# Patient Record
Sex: FEMALE | Employment: UNEMPLOYED | URBAN - METROPOLITAN AREA
[De-identification: names, ages, dates, MRNs, and addresses within clinical notes are randomized per-mention and may not be internally consistent; named-entity substitution may affect disease eponyms.]

---

## 2022-03-19 ENCOUNTER — HOSPITAL ENCOUNTER (INPATIENT)
Age: 27
LOS: 4 days | Discharge: HOME | DRG: 880 | End: 2022-03-23
Attending: EMERGENCY MEDICINE | Admitting: PSYCHIATRY & NEUROLOGY

## 2022-03-19 DIAGNOSIS — F19.10 SUBSTANCE ABUSE (HCC): ICD-10-CM

## 2022-03-19 DIAGNOSIS — F41.8 ANXIETY ASSOCIATED WITH DEPRESSION: Primary | ICD-10-CM

## 2022-03-19 PROBLEM — F43.10 PTSD (POST-TRAUMATIC STRESS DISORDER): Status: ACTIVE | Noted: 2022-03-19

## 2022-03-19 LAB
ALBUMIN SERPL-MCNC: 4.4 G/DL (ref 3.5–5)
ALBUMIN/GLOB SERPL: 1.1 {RATIO} (ref 1.1–2.2)
ALP SERPL-CCNC: 98 U/L (ref 45–117)
ALT SERPL-CCNC: 32 U/L (ref 12–78)
AMPHET UR QL SCN: NEGATIVE
ANION GAP SERPL CALC-SCNC: 4 MMOL/L (ref 5–15)
APAP SERPL-MCNC: <2 UG/ML (ref 10–30)
APPEARANCE UR: CLEAR
AST SERPL-CCNC: 16 U/L (ref 15–37)
BACTERIA URNS QL MICRO: NEGATIVE /HPF
BARBITURATES UR QL SCN: NEGATIVE
BASOPHILS # BLD: 0.1 K/UL (ref 0–0.1)
BASOPHILS NFR BLD: 0 % (ref 0–1)
BENZODIAZ UR QL: NEGATIVE
BILIRUB SERPL-MCNC: 0.2 MG/DL (ref 0.2–1)
BILIRUB UR QL: NEGATIVE
BUN SERPL-MCNC: 7 MG/DL (ref 6–20)
BUN/CREAT SERPL: 10 (ref 12–20)
CALCIUM SERPL-MCNC: 9.8 MG/DL (ref 8.5–10.1)
CANNABINOIDS UR QL SCN: POSITIVE
CHLORIDE SERPL-SCNC: 108 MMOL/L (ref 97–108)
CO2 SERPL-SCNC: 23 MMOL/L (ref 21–32)
COCAINE UR QL SCN: NEGATIVE
COLOR UR: NORMAL
COMMENT, HOLDF: NORMAL
CREAT SERPL-MCNC: 0.67 MG/DL (ref 0.55–1.02)
DIFFERENTIAL METHOD BLD: ABNORMAL
DRUG SCRN COMMENT,DRGCM: ABNORMAL
EOSINOPHIL # BLD: 0.1 K/UL (ref 0–0.4)
EOSINOPHIL NFR BLD: 0 % (ref 0–7)
EPITH CASTS URNS QL MICRO: NORMAL /LPF
ERYTHROCYTE [DISTWIDTH] IN BLOOD BY AUTOMATED COUNT: 11.9 % (ref 11.5–14.5)
ETHANOL SERPL-MCNC: <10 MG/DL
FLUAV RNA SPEC QL NAA+PROBE: NOT DETECTED
FLUAV RNA SPEC QL NAA+PROBE: NOT DETECTED
FLUBV RNA SPEC QL NAA+PROBE: NOT DETECTED
FLUBV RNA SPEC QL NAA+PROBE: NOT DETECTED
GLOBULIN SER CALC-MCNC: 4 G/DL (ref 2–4)
GLUCOSE SERPL-MCNC: 99 MG/DL (ref 65–100)
GLUCOSE UR STRIP.AUTO-MCNC: NEGATIVE MG/DL
HCG SERPL QL: NEGATIVE
HCG UR QL: NEGATIVE
HCT VFR BLD AUTO: 44.1 % (ref 35–47)
HGB BLD-MCNC: 14.7 G/DL (ref 11.5–16)
HGB UR QL STRIP: NEGATIVE
HYALINE CASTS URNS QL MICRO: NORMAL /LPF (ref 0–5)
IMM GRANULOCYTES # BLD AUTO: 0.1 K/UL (ref 0–0.04)
IMM GRANULOCYTES NFR BLD AUTO: 1 % (ref 0–0.5)
KETONES UR QL STRIP.AUTO: NEGATIVE MG/DL
LEUKOCYTE ESTERASE UR QL STRIP.AUTO: NEGATIVE
LYMPHOCYTES # BLD: 2 K/UL (ref 0.8–3.5)
LYMPHOCYTES NFR BLD: 12 % (ref 12–49)
MCH RBC QN AUTO: 29.6 PG (ref 26–34)
MCHC RBC AUTO-ENTMCNC: 33.3 G/DL (ref 30–36.5)
MCV RBC AUTO: 88.9 FL (ref 80–99)
METHADONE UR QL: NEGATIVE
MONOCYTES # BLD: 1 K/UL (ref 0–1)
MONOCYTES NFR BLD: 6 % (ref 5–13)
NEUTS SEG # BLD: 13.6 K/UL (ref 1.8–8)
NEUTS SEG NFR BLD: 81 % (ref 32–75)
NITRITE UR QL STRIP.AUTO: NEGATIVE
NRBC # BLD: 0 K/UL (ref 0–0.01)
NRBC BLD-RTO: 0 PER 100 WBC
OPIATES UR QL: NEGATIVE
PCP UR QL: NEGATIVE
PH UR STRIP: 7 [PH] (ref 5–8)
PLATELET # BLD AUTO: 300 K/UL (ref 150–400)
PMV BLD AUTO: 10.5 FL (ref 8.9–12.9)
POTASSIUM SERPL-SCNC: 3.7 MMOL/L (ref 3.5–5.1)
PROT SERPL-MCNC: 8.4 G/DL (ref 6.4–8.2)
PROT UR STRIP-MCNC: NEGATIVE MG/DL
RBC # BLD AUTO: 4.96 M/UL (ref 3.8–5.2)
RBC #/AREA URNS HPF: NORMAL /HPF (ref 0–5)
SALICYLATES SERPL-MCNC: <1.7 MG/DL (ref 2.8–20)
SAMPLES BEING HELD,HOLD: NORMAL
SARS-COV-2, COV2: NOT DETECTED
SARS-COV-2, COV2: NOT DETECTED
SODIUM SERPL-SCNC: 135 MMOL/L (ref 136–145)
SP GR UR REFRACTOMETRY: 1 (ref 1–1.03)
UR CULT HOLD, URHOLD: NORMAL
UROBILINOGEN UR QL STRIP.AUTO: 0.2 EU/DL (ref 0.2–1)
WBC # BLD AUTO: 16.8 K/UL (ref 3.6–11)
WBC URNS QL MICRO: NORMAL /HPF (ref 0–4)

## 2022-03-19 PROCEDURE — 80143 DRUG ASSAY ACETAMINOPHEN: CPT

## 2022-03-19 PROCEDURE — 65270000029 HC RM PRIVATE

## 2022-03-19 PROCEDURE — 80053 COMPREHEN METABOLIC PANEL: CPT

## 2022-03-19 PROCEDURE — 84703 CHORIONIC GONADOTROPIN ASSAY: CPT

## 2022-03-19 PROCEDURE — 36415 COLL VENOUS BLD VENIPUNCTURE: CPT

## 2022-03-19 PROCEDURE — 84443 ASSAY THYROID STIM HORMONE: CPT

## 2022-03-19 PROCEDURE — 74011250637 HC RX REV CODE- 250/637: Performed by: NURSE PRACTITIONER

## 2022-03-19 PROCEDURE — 80307 DRUG TEST PRSMV CHEM ANLYZR: CPT

## 2022-03-19 PROCEDURE — 81025 URINE PREGNANCY TEST: CPT

## 2022-03-19 PROCEDURE — 90791 PSYCH DIAGNOSTIC EVALUATION: CPT

## 2022-03-19 PROCEDURE — 87636 SARSCOV2 & INF A&B AMP PRB: CPT

## 2022-03-19 PROCEDURE — 99285 EMERGENCY DEPT VISIT HI MDM: CPT

## 2022-03-19 PROCEDURE — 82077 ASSAY SPEC XCP UR&BREATH IA: CPT

## 2022-03-19 PROCEDURE — 80179 DRUG ASSAY SALICYLATE: CPT

## 2022-03-19 PROCEDURE — 85025 COMPLETE CBC W/AUTO DIFF WBC: CPT

## 2022-03-19 PROCEDURE — 81001 URINALYSIS AUTO W/SCOPE: CPT

## 2022-03-19 RX ORDER — CLONAZEPAM 1 MG/1
0.5 TABLET ORAL
Status: COMPLETED | OUTPATIENT
Start: 2022-03-19 | End: 2022-03-19

## 2022-03-19 RX ORDER — IBUPROFEN 200 MG
1 TABLET ORAL
Status: COMPLETED | OUTPATIENT
Start: 2022-03-19 | End: 2022-03-21

## 2022-03-19 RX ADMIN — CLONAZEPAM 0.5 MG: 1 TABLET ORAL at 21:11
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
## 2022-03-19 NOTE — ED TRIAGE NOTES
Pt presents to department with a CC of SI without plan. Pt reports she has a hx of suicide attempt when she was 14 by attempting to OD on pills and drown herself in the bathtub. Pt agreeable to be admitted for behavioral health treatment.

## 2022-03-19 NOTE — ED PROVIDER NOTES
HPI   Komal Bhat is a 27 y.o. female with Hx of depression, anxiety, PTSD who presents ambulatory to [facility] ED with cc of anxiety. Patient reports that she has concerns for escalating thoughts of self-harm and lives alone. Has friends who are supportive. She does not have a plan, but is worried that she will soon have 1. She states that she lives alone and her family lives in [place] and [place]. Patient states that she has history of mental health admissions in the past, nothing recent. She states that she normally goes to [facility] for counseling, but missed her last appointment. She states she is otherwise been in her usual state of health, denies any Covid or flulike symptoms. States that she did have an upper respiratory infection last week, but symptoms have since improved. Denies F/C, N/V/D, cough, congestion, CP, SOB,urinary concerns. Reports tobacco, THC, occasional mushroom use versus abuse. Denies any alcohol or other substance abuse. Denies chance of pregnancy. PCP: No primary care provider on file. There are no other complaints, changes or physical findings at this time. Past Medical History:   Diagnosis Date    Anxiety     Depression     PTSD (post-traumatic stress disorder)        History reviewed. No pertinent surgical history. History reviewed. No pertinent family history. Social History     Socioeconomic History    Marital status: SINGLE     Spouse name: Not on file    Number of children: Not on file    Years of education: Not on file    Highest education level: Not on file   Occupational History    Not on file   Tobacco Use    Smoking status: Current Every Day Smoker     Packs/day: 0.25    Smokeless tobacco: Never Used   Substance and Sexual Activity    Alcohol use:  Yes     Alcohol/week: 3.0 standard drinks     Types: 3 Shots of liquor per week    Drug use: Yes     Frequency: 7.0 times per week     Types: Marijuana    Sexual activity: Not on file   Other Topics Concern    Not on file   Social History Narrative    Not on file     Social Determinants of Health     Financial Resource Strain:     Difficulty of Paying Living Expenses: Not on file   Food Insecurity:     Worried About [address] in the Last Year: Not on file    Ramesh of Food in the Last Year: Not on file   Transportation Needs:     Lack of Transportation (Medical): Not on file    Lack of Transportation (Non-Medical): Not on file   Physical Activity:     Days of Exercise per Week: Not on file    Minutes of Exercise per Session: Not on file   Stress:     Feeling of Stress : Not on file   Social Connections:     Frequency of Communication with Friends and Family: Not on file    Frequency of Social Gatherings with Friends and Family: Not on file    Attends [employer] Services: Not on file    Active Member of [address] [place] [address] or Organizations: Not on file    Attends Club or Organization Meetings: Not on file    Marital Status: Not on file   Intimate Partner Violence:     Fear of Current or Ex-Partner: Not on file    Emotionally Abused: Not on file    Physically Abused: Not on file    Sexually Abused: Not on file   Housing Stability:     Unable to Pay for Housing in the Last Year: Not on file    Number of [place] in the Last Year: Not on file    Unstable Housing in the Last Year: Not on file         ALLERGIES: Penicillins and Sulfa (sulfonamide antibiotics)    Review of Systems   Constitutional: Negative for activity change, appetite change, chills and fever. HENT: Negative for congestion, rhinorrhea and sore throat. Eyes: Negative for visual disturbance. Respiratory: Negative for cough and shortness of breath. Cardiovascular: Negative for chest pain. Gastrointestinal: Negative for abdominal pain, diarrhea, nausea and vomiting. Genitourinary: Negative for dysuria, flank pain, frequency and urgency.    Musculoskeletal: Negative for arthralgias, back pain, gait problem and neck pain. Skin: Negative for color change and rash. Neurological: Negative for dizziness, speech difficulty, weakness, light-headedness, numbness and headaches. Psychiatric/Behavioral: Positive for dysphoric mood and suicidal ideas. Negative for agitation, behavioral problems, confusion and self-injury. The patient is nervous/anxious. All other systems reviewed and are negative. Vitals:    03/19/22 1828   BP: (!) 160/100   Pulse: 97   Resp: 16   Temp: 98 °F (36.7 °C)   SpO2: 98%            Physical Exam  Vitals and nursing note reviewed. Constitutional:       General: She is not in acute distress. Appearance: She is well-developed. HENT:      Head: Normocephalic and atraumatic. Right Ear: External ear normal.      Left Ear: External ear normal.   Eyes:      Conjunctiva/sclera: Conjunctivae normal.      Pupils: Pupils are equal, round, and reactive to light. Cardiovascular:      Rate and Rhythm: Normal rate and regular rhythm. Heart sounds: Normal heart sounds. Pulmonary:      Effort: Pulmonary effort is normal.      Breath sounds: Normal breath sounds. Musculoskeletal:         General: Normal range of motion. Cervical back: Normal range of motion and neck supple. Skin:     General: Skin is warm and dry. Neurological:      Mental Status: She is alert and oriented to person, place, and time. Psychiatric:         Attention and Perception: She is inattentive. Mood and Affect: Mood is anxious. Affect is labile and tearful. Speech: Speech is rapid and pressured. Behavior: Behavior is hyperactive. Thought Content: Thought content includes suicidal ideation.          Cognition and Memory: Cognition and memory normal.         Judgment: Judgment normal.          MDM  Number of Diagnoses or Management Options  Anxiety associated with depression  Substance abuse ([facility] [address]  Diagnosis management comments: Ddx: depression, anxiety, mood d/o Patient presents to the emergency department with concern for anxiety, with depression, mood related concerns. Patient was provided with anxiolytic as she was picking at her skin becoming more agitated while in the emergency department. She is medically clear for admission to inpatient psychiatry. Did have an elevated white blood cell count, but did not relate any current infectious symptoms. She did state that she had a mild respiratory illness last week and this could have been contributory. Amount and/or Complexity of Data Reviewed  Clinical lab tests: reviewed and ordered  Review and summarize past medical records: yes           Procedures    LABORATORY TESTS:  Recent Results (from the past 12 hour(s))   CBC WITH AUTOMATED DIFF    Collection Time: 03/19/22  7:28 PM   Result Value Ref Range    WBC 16.8 (H) 3.6 - 11.0 K/uL    RBC 4.96 3.80 - 5.20 M/uL    HGB 14.7 11.5 - 16.0 g/dL    HCT 44.1 35.0 - 47.0 %    MCV 88.9 80.0 - 99.0 FL    MCH 29.6 26.0 - 34.0 PG    MCHC 33.3 30.0 - 36.5 g/dL    RDW 11.9 11.5 - 14.5 %    PLATELET 595 695 - 950 K/uL    MPV 10.5 8.9 - 12.9 FL    NRBC 0.0 0  WBC    ABSOLUTE NRBC 0.00 0.00 - 0.01 K/uL    NEUTROPHILS 81 (H) 32 - 75 %    LYMPHOCYTES 12 12 - 49 %    MONOCYTES 6 5 - 13 %    EOSINOPHILS 0 0 - 7 %    BASOPHILS 0 0 - 1 %    IMMATURE GRANULOCYTES 1 (H) 0.0 - 0.5 %    ABS. NEUTROPHILS 13.6 (H) 1.8 - 8.0 K/UL    ABS. LYMPHOCYTES 2.0 0.8 - 3.5 K/UL    ABS. MONOCYTES 1.0 0.0 - 1.0 K/UL    ABS. EOSINOPHILS 0.1 0.0 - 0.4 K/UL    ABS. BASOPHILS 0.1 0.0 - 0.1 K/UL    ABS. IMM.  GRANS. 0.1 (H) 0.00 - 0.04 K/UL    DF AUTOMATED     METABOLIC PANEL, COMPREHENSIVE    Collection Time: 03/19/22  7:28 PM   Result Value Ref Range    Sodium 135 (L) 136 - 145 mmol/L    Potassium 3.7 3.5 - 5.1 mmol/L    Chloride 108 97 - 108 mmol/L    CO2 23 21 - 32 mmol/L    Anion gap 4 (L) 5 - 15 mmol/L    Glucose 99 65 - 100 mg/dL    BUN 7 6 - 20 MG/DL    Creatinine 0.67 0.55 - 1.02 MG/DL BUN/Creatinine ratio 10 (L) 12 - 20      GFR est AA >60 >60 ml/min/1.73m2    GFR est non-AA >60 >60 ml/min/1.73m2    Calcium 9.8 8.5 - 10.1 MG/DL    Bilirubin, total 0.2 0.2 - 1.0 MG/DL    ALT (SGPT) 32 12 - 78 U/L    AST (SGOT) 16 15 - 37 U/L    Alk. phosphatase 98 45 - 117 U/L    Protein, total 8.4 (H) 6.4 - 8.2 g/dL    Albumin 4.4 3.5 - 5.0 g/dL    Globulin 4.0 2.0 - 4.0 g/dL    A-G Ratio 1.1 1.1 - 2.2     ETHYL ALCOHOL    Collection Time: 03/19/22  7:28 PM   Result Value Ref Range    ALCOHOL(ETHYL),SERUM <10 <10 MG/DL   ACETAMINOPHEN    Collection Time: 03/19/22  7:28 PM   Result Value Ref Range    Acetaminophen level <2 (L) 10 - 30 ug/mL   SALICYLATE    Collection Time: 03/19/22  7:28 PM   Result Value Ref Range    Salicylate level <8.9 (L) 2.8 - 20.0 MG/DL   COVID-19 WITH INFLUENZA A/B    Collection Time: 03/19/22  7:28 PM   Result Value Ref Range    SARS-CoV-2 by PCR Not detected NOTD      Influenza A by PCR Not detected NOTD      Influenza B by PCR Not detected NOTD     SAMPLES BEING HELD    Collection Time: 03/19/22  7:28 PM   Result Value Ref Range    SAMPLES BEING HELD 1red     COMMENT        Add-on orders for these samples will be processed based on acceptable specimen integrity and analyte stability, which may vary by analyte.    HCG QL SERUM    Collection Time: 03/19/22  7:28 PM   Result Value Ref Range    HCG, Ql. Negative NEG     DRUG SCREEN, URINE    Collection Time: 03/19/22  7:38 PM   Result Value Ref Range    AMPHETAMINES Negative NEG      BARBITURATES Negative NEG      BENZODIAZEPINES Negative NEG      COCAINE Negative NEG      METHADONE Negative NEG      OPIATES Negative NEG      PCP(PHENCYCLIDINE) Negative NEG      THC (TH-CANNABINOL) Positive (A) NEG      Drug screen comment (NOTE)    URINALYSIS W/MICROSCOPIC    Collection Time: 03/19/22  7:38 PM   Result Value Ref Range    Color YELLOW/STRAW      Appearance CLEAR CLEAR      Specific gravity 1.005 1.003 - 1.030      pH (UA) 7.0 5.0 - 8.0 Protein Negative NEG mg/dL    Glucose Negative NEG mg/dL    Ketone Negative NEG mg/dL    Bilirubin Negative NEG      Blood Negative NEG      Urobilinogen 0.2 0.2 - 1.0 EU/dL    Nitrites Negative NEG      Leukocyte Esterase Negative NEG      WBC 0-4 0 - 4 /hpf    RBC 0-5 0 - 5 /hpf    Epithelial cells FEW FEW /lpf    Bacteria Negative NEG /hpf    Hyaline cast 0-2 0 - 5 /lpf   URINE CULTURE HOLD SAMPLE    Collection Time: 03/19/22  7:38 PM    Specimen: Serum; Urine   Result Value Ref Range    Urine culture hold        Urine on hold in Microbiology dept for 2 days. If unpreserved urine is submitted, it cannot be used for addtional testing after 24 hours, recollection will be required.    COVID-19 WITH INFLUENZA A/B    Collection Time: 03/19/22  8:51 PM   Result Value Ref Range    SARS-CoV-2 by PCR Not detected NOTD      Influenza A by PCR Not detected NOTD      Influenza B by PCR Not detected NOTD     HCG URINE, QL. - POC    Collection Time: 03/19/22  9:33 PM   Result Value Ref Range    Pregnancy test,urine (POC) Negative NEG         IMAGING RESULTS:  No orders to display       MEDICATIONS GIVEN:  Medications   nicotine (NICODERM CQ) 14 mg/24 hr patch 1 Patch (1 Patch TransDERmal Apply Patch 3/19/22 2117)   OLANZapine (ZyPREXA) tablet 5 mg (has no administration in time range)   haloperidol lactate (HALDOL) injection 5 mg (has no administration in time range)   benztropine (COGENTIN) tablet 1 mg (has no administration in time range)   diphenhydrAMINE (BENADRYL) injection 50 mg (has no administration in time range)   hydrOXYzine HCL (ATARAX) tablet 50 mg (50 mg Oral Given 3/20/22 0111)   LORazepam (ATIVAN) injection 1 mg (has no administration in time range)   traZODone (DESYREL) tablet 50 mg (50 mg Oral Given 3/20/22 0111)   acetaminophen (TYLENOL) tablet 650 mg (has no administration in time range)   magnesium hydroxide (MILK OF MAGNESIA) 400 mg/5 mL oral suspension 30 mL (has no administration in time range) clonazePAM (KlonoPIN) tablet 0.5 mg (0.5 mg Oral Given 3/19/22 2111)       IMPRESSION:  1. Anxiety associated with depression    2. Substance abuse ([facility] [address]        PLAN:  Admit to inpatient mental health.      Nitza Campbell NP

## 2022-03-20 LAB — TSH SERPL DL<=0.05 MIU/L-ACNC: 0.92 UIU/ML (ref 0.36–3.74)

## 2022-03-20 PROCEDURE — 74011250637 HC RX REV CODE- 250/637: Performed by: NURSE PRACTITIONER

## 2022-03-20 PROCEDURE — 65220000003 HC RM SEMIPRIVATE PSYCH

## 2022-03-20 PROCEDURE — 74011250637 HC RX REV CODE- 250/637

## 2022-03-20 RX ORDER — LORAZEPAM 2 MG/ML
1 INJECTION INTRAMUSCULAR
Status: DISCONTINUED | OUTPATIENT
Start: 2022-03-20 | End: 2022-03-23 | Stop reason: HOSPADM

## 2022-03-20 RX ORDER — OLANZAPINE 5 MG/1
5 TABLET ORAL
Status: DISCONTINUED | OUTPATIENT
Start: 2022-03-20 | End: 2022-03-21

## 2022-03-20 RX ORDER — IBUPROFEN 200 MG
1 TABLET ORAL
Status: DISCONTINUED | OUTPATIENT
Start: 2022-03-21 | End: 2022-03-20

## 2022-03-20 RX ORDER — HALOPERIDOL 5 MG/ML
5 INJECTION INTRAMUSCULAR
Status: DISCONTINUED | OUTPATIENT
Start: 2022-03-20 | End: 2022-03-23 | Stop reason: HOSPADM

## 2022-03-20 RX ORDER — ACETAMINOPHEN 325 MG/1
650 TABLET ORAL
Status: DISCONTINUED | OUTPATIENT
Start: 2022-03-20 | End: 2022-03-23 | Stop reason: HOSPADM

## 2022-03-20 RX ORDER — TRAZODONE HYDROCHLORIDE 50 MG/1
50 TABLET ORAL
Status: DISCONTINUED | OUTPATIENT
Start: 2022-03-20 | End: 2022-03-23 | Stop reason: HOSPADM

## 2022-03-20 RX ORDER — IBUPROFEN 200 MG
1 TABLET ORAL DAILY
Status: DISCONTINUED | OUTPATIENT
Start: 2022-03-21 | End: 2022-03-20

## 2022-03-20 RX ORDER — DM/P-EPHED/ACETAMINOPH/DOXYLAM 30-7.5/3
2 LIQUID (ML) ORAL
Status: DISCONTINUED | OUTPATIENT
Start: 2022-03-20 | End: 2022-03-23 | Stop reason: HOSPADM

## 2022-03-20 RX ORDER — ADHESIVE BANDAGE
30 BANDAGE TOPICAL DAILY PRN
Status: DISCONTINUED | OUTPATIENT
Start: 2022-03-20 | End: 2022-03-23 | Stop reason: HOSPADM

## 2022-03-20 RX ORDER — DIPHENHYDRAMINE HYDROCHLORIDE 50 MG/ML
50 INJECTION, SOLUTION INTRAMUSCULAR; INTRAVENOUS
Status: DISCONTINUED | OUTPATIENT
Start: 2022-03-20 | End: 2022-03-23 | Stop reason: HOSPADM

## 2022-03-20 RX ORDER — BENZTROPINE MESYLATE 1 MG/1
1 TABLET ORAL
Status: DISCONTINUED | OUTPATIENT
Start: 2022-03-20 | End: 2022-03-23 | Stop reason: HOSPADM

## 2022-03-20 RX ORDER — HYDROXYZINE 50 MG/1
50 TABLET, FILM COATED ORAL
Status: DISCONTINUED | OUTPATIENT
Start: 2022-03-20 | End: 2022-03-21

## 2022-03-20 RX ORDER — ARIPIPRAZOLE 5 MG/1
5 TABLET ORAL DAILY
Status: DISCONTINUED | OUTPATIENT
Start: 2022-03-20 | End: 2022-03-21

## 2022-03-20 RX ADMIN — HYDROXYZINE HYDROCHLORIDE 50 MG: 50 TABLET, FILM COATED ORAL at 17:33

## 2022-03-20 RX ADMIN — TRAZODONE HYDROCHLORIDE 50 MG: 50 TABLET ORAL at 22:21

## 2022-03-20 RX ADMIN — TRAZODONE HYDROCHLORIDE 50 MG: 50 TABLET ORAL at 01:11

## 2022-03-20 RX ADMIN — ACETAMINOPHEN 650 MG: 325 TABLET ORAL at 09:25

## 2022-03-20 RX ADMIN — ARIPIPRAZOLE 5 MG: 5 TABLET ORAL at 12:28

## 2022-03-20 RX ADMIN — OLANZAPINE 5 MG: 5 TABLET, FILM COATED ORAL at 18:13

## 2022-03-20 RX ADMIN — HYDROXYZINE HYDROCHLORIDE 50 MG: 50 TABLET, FILM COATED ORAL at 01:11

## 2022-03-20 RX ADMIN — HYDROXYZINE HYDROCHLORIDE 50 MG: 50 TABLET, FILM COATED ORAL at 09:25

## 2022-03-20 NOTE — ROUTINE PROCESS
TRANSFER - OUT REPORT:    Verbal report given to Louie House RN(name) on Elizabeth Keith  being transferred to 6/(unit) for routine progression of care       Report consisted of patients Situation, Background, Assessment and   Recommendations(SBAR). Information from the following report(s) SBAR was reviewed with the receiving nurse. Lines:       Opportunity for questions and clarification was provided.       Patient transported with:   Tech, and secutrity
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
## 2022-03-20 NOTE — ED NOTES
Patient asked if I could please remove or hide the cord to the thermometer, stating \"my brain keeps making me think I should try to kill myself with it. \"  Thermometer is mounted on wall. Cord is now hidden.
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
## 2022-03-20 NOTE — PROGRESS NOTES
PRN Medication Documentation    Specific patient behavior that led to need for PRN medication: c/o anxiety  Staff interventions attempted prior to PRN being given: therapeutic communication  PRN medication given: Atarax @ 1260  Patient response/effectiveness of PRN medication: Will continue to monitor        Problem: Falls - Risk of  Goal: *Absence of Falls  Description: Document Shila Bourne Fall Risk and appropriate interventions in the flowsheet.   Outcome: Progressing Towards Goal  Note: Fall Risk Interventions:  Problem: Patient Education: Go to Patient Education Activity  Goal: Patient/Family Education  Outcome: Progressing Towards Goal  Problem: Depressed Mood (Adult/Pediatric)  Goal: *STG: Attends activities and groups  Outcome: Progressing Towards Goal  Goal: *STG: Remains safe in hospital  Outcome: Progressing Towards Goal  Goal: *LTG: Understands illness and can identify signs of relapse  Outcome: Progressing Towards Goal

## 2022-03-20 NOTE — PROGRESS NOTES
[facility]  Master Treatment Plan for Belia Amador    Date Treatment Plan Initiated: 3/20/22    Treatment Plan Modalities:  Type of Modality Amount  (x minutes) Frequency (x/week) Duration (x days) Name of Responsible Staff   Community & wrap-up meetings to encourage peer interactions 15 7 1 MAU Martell     Group psychotherapy to assist in building coping skills and internal controls 60 7 1 Amanda Monsivais   Therapeutic activity groups to build coping skills 60 7 1 Amanda Monsivais   Psychoeducation in group setting to address:   Medication education   15 [address] PharmD   Coping skills   30 3 1 Amanda Monsivais   Relaxation techniques         Symptom management         Discharge planning   60 2 1 Amanda Monsivais   Spirituality    60 2 1 Chaplain GLYNN   61 1 1 [facility]/AA/NA         Physician medication management   15 7 1 Dr. Melchor Zamora meeting/discharge planning   15 2 1 Norma Weller and Amanda Monsivais                                   Problem: Falls - Risk of  Goal: *Absence of Falls  Description: Document Salomon Fall Risk and appropriate interventions in the flowsheet. Outcome: Progressing Towards Goal  Note: Fall Risk Interventions:     Problem: Patient Education: Go to Patient Education Activity  Goal: Patient/Family Education  Outcome: Progressing Towards Goal     Problem: Depressed Mood (Adult/Pediatric)  Goal: *STG: Participates in treatment plan  Outcome: Progressing Towards Goal  Note: Pt participated in treatment team. Isolating in her room. Ate breakfast during the shift. Goal: *STG: Verbalizes anger, guilt, and other feelings in a constructive manor  Outcome: Progressing Towards Goal  Note: Able to verbalize feelings in a constructive manor. Stated she is feeling depressed for several months. Complaining of poor sleep. Goal: *STG: Attends activities and groups  Outcome: Progressing Towards Goal  Note: Encouraged pt to attend groups and and participate. Goal: *STG: Remains safe in hospital  Outcome: Progressing Towards Goal  Note: Remains safe on the unit and continued on Q 15 minute safety checks. Goal: *STG: Complies with medication therapy  Outcome: Progressing Towards Goal  Note: No scheduled medications at this time.   Goal: Interventions  Outcome: Progressing Towards Goal     Problem: Patient Education: Go to Patient Education Activity  Goal: Patient/Family Education  Outcome: Progressing Towards Goal
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
## 2022-03-20 NOTE — BH NOTES
PRN Medication Documentation    Specific patient behavior that led to need for PRN medication: pt c/o anxiety and requesting medication to promote rest  Staff interventions attempted prior to PRN being given: med education and therapeutic communication   PRN medication given: atarax 50 mg po and trazodone 50 mg po  Patient response/effectiveness of PRN medication: staff will continue to monitor and provide support.

## 2022-03-20 NOTE — BH NOTES
GROUP THERAPY PROGRESS NOTE    Patient did not participate in Self-Care group.     Jolynn Crawford, Social Work Case Management

## 2022-03-20 NOTE — BH NOTES
TRANSFER - IN REPORT:    Verbal report received from [address] [place] [address], [address] RN(name) on Jesus Alberto Montes  being received from [facility] [place] ED(unit) for routine progression of care      Report consisted of patients Situation, Background, Assessment and   Recommendations(SBAR). Information from the following report(s) SBAR, MAR and Recent Results was reviewed with the receiving nurse. Opportunity for questions and clarification was provided. Assessment completed upon patients arrival to unit and care assumed.

## 2022-03-21 PROCEDURE — 74011250637 HC RX REV CODE- 250/637

## 2022-03-21 PROCEDURE — 74011250637 HC RX REV CODE- 250/637: Performed by: PSYCHIATRY & NEUROLOGY

## 2022-03-21 PROCEDURE — 74011250637 HC RX REV CODE- 250/637: Performed by: NURSE PRACTITIONER

## 2022-03-21 PROCEDURE — 65220000003 HC RM SEMIPRIVATE PSYCH

## 2022-03-21 RX ORDER — ARIPIPRAZOLE 2 MG/1
2 TABLET ORAL DAILY
Status: DISCONTINUED | OUTPATIENT
Start: 2022-03-22 | End: 2022-03-23 | Stop reason: HOSPADM

## 2022-03-21 RX ORDER — HYDROXYZINE 50 MG/1
50 TABLET, FILM COATED ORAL
Status: DISCONTINUED | OUTPATIENT
Start: 2022-03-21 | End: 2022-03-23 | Stop reason: HOSPADM

## 2022-03-21 RX ORDER — LAMOTRIGINE 25 MG/1
25 TABLET ORAL DAILY
Status: DISCONTINUED | OUTPATIENT
Start: 2022-03-21 | End: 2022-03-23 | Stop reason: HOSPADM

## 2022-03-21 RX ADMIN — NICOTINE POLACRILEX 2 MG: 2 LOZENGE ORAL at 14:38

## 2022-03-21 RX ADMIN — TRAZODONE HYDROCHLORIDE 50 MG: 50 TABLET ORAL at 20:56

## 2022-03-21 RX ADMIN — Medication 1 LOZENGE: at 20:56

## 2022-03-21 RX ADMIN — Medication 1 LOZENGE: at 11:05

## 2022-03-21 RX ADMIN — HYDROXYZINE HYDROCHLORIDE 50 MG: 50 TABLET, FILM COATED ORAL at 23:45

## 2022-03-21 RX ADMIN — ARIPIPRAZOLE 5 MG: 5 TABLET ORAL at 08:43

## 2022-03-21 RX ADMIN — LAMOTRIGINE 25 MG: 25 TABLET ORAL at 11:05

## 2022-03-21 RX ADMIN — Medication 1 LOZENGE: at 17:23

## 2022-03-21 NOTE — PROGRESS NOTES
Problem: Depressed Mood (Adult/Pediatric)  Goal: *STG: Participates in treatment plan  Outcome: Progressing Towards Goal  Note: Out on unit engaged, social w a brighter affect, mood stable and reports hopeful. Actively participating in groups and activities. Future focused, denies SI, no self harming behaviors. States intrusive thoughts are gone and reports feeling safe. Daily goal is to discuss d/c home. Staff focus is on offering support and reassurance.    Goal: *STG: Verbalizes anger, guilt, and other feelings in a constructive manor  Outcome: Progressing Towards Goal  Goal: *STG: Attends activities and groups  Outcome: Progressing Towards Goal  Goal: Interventions  Outcome: Progressing Towards Goal

## 2022-03-21 NOTE — BH NOTES
PRN Medication Documentation    Specific patient behavior that led to need for PRN medication: increased anxiety  Staff interventions attempted prior to PRN being given: decrease stimuli, dimming lights, etc  PRN medication given: PO atarax  Patient response/effectiveness of PRN medication: will continue to monitor

## 2022-03-21 NOTE — BH NOTES
GROUP THERAPY PROGRESS NOTE    Patient did not participate in Self-care group.     Rhys Cannon, Social Work Case Management

## 2022-03-21 NOTE — BH NOTES
GROUP THERAPY PROGRESS NOTE    Patient is participating in psychotherapy group. Group time: 60 minutes    Personal goal for participation: to develop an understanding of cognitive distortions and how to alter our thinking. Goal orientation: Personal    Group therapy participation:  Active     Therapeutic interventions reviewed and discussed:  Group members were guided through learning about cognitive distortions. Members gained an understanding of how these types of distortions effect perception, relationships, and overall mental health. Members were guided through learning about methods that can be used for changing negative thought patterns. Members were able to identify distortions and engage in discussion about past experiences. Handout provided. Impression of participation:  Pt was present and engaged in group discussion. Pt added insight to group topic. Pt interacted with peers and sw. Pt was calm, cooperative.        ADELAIDA Mcdermott, QMHP-A

## 2022-03-21 NOTE — BH NOTES
Behavioral Health Interdisciplinary Note     Patient goal(s) for today: Participate in groups and voice concerns to staff. Treatment team focus/goals: Treatment and discharge planning. Progress note: Pt was AOx4. Pt was pleasant and cooperative. Pt has good insight and reports coming to the realization they are not deserving of self-harm feelings. Pt reports less intrusive thoughts of self-harm. Pt reported Hx of self-harm 10 years ago. Pt reports feeling foggy. Pt signed YONY for father, Octavio Cartagena. MSW intern spoke with pts father about discharge plans. Pts father is supportive of pt. LOS:  3                       Expected LOS:      Financial concerns/prescription coverage: [employer] cross blue shield. Family contact: Octavio Cartagena, father [phone] (SIGNED YONY)                   Family requesting physician contact today:  none  Discharge plan: Pt reports living alone. Access to weapons : none reported. Outpatient provider(s):  - [facility], medication management intake with Omar Price, March 31st @10AM, virtual.   - Jeni Ellsworth, [email or web address], counselor ([phone]) Pt will call to schedule appointment. Patient's preferred phone number for follow up call : [phone]  Patient's preferred e-mail address : [email or web address]  Participating treatment team members: Keron Peng;  [address], MSW Intern; ANAND Jara; Albin Moore NP; Jacqueline CUEVAS; Nilay Moore, NP.

## 2022-03-21 NOTE — BH NOTES
PSYCHIATRIC PROGRESS NOTE       Patient: Radha Burnett MRN: [id number]  SSN: [id number]    YOB: 1995  Age: 27 y.o. Sex: adult      Admit Date: 3/19/2022    LOS: 2 days       Chief Complaint:  Better than yesterday. Interval History:  Radha Burnett \"Bee\" says they are feeling better. Says suicidal thoughts are decreasing in frequency. Denies any intent or plan. Denies hi or hallucinations. Says sleep varies, wakes up in cold sweats. Says they did well on wellbutrin but had \"shakes\" from it. 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 Educated them that abilify has more chances of tremors than antidepressants. Not comfortable starting ssri. They are willing to start lamictal. The adverse events of this medication were discussed with the patient Radha Burnett in detail including SJS. Radha Burnett understands the benefits as well as the risks involved in taking it and gave verbal informed consent for the same. Will decrease abilify to 2 mg. Calm and pleasant. She received quite a few prns last night, feeling foggy this morning.        Past Medical History:  Past Medical History:   Diagnosis Date    Anxiety     Depression     PTSD (post-traumatic stress disorder)          ALLERGIES:(reviewed/updated 3/21/2022)  Allergies   Allergen Reactions    Penicillins Other (comments)    Sulfa (Sulfonamide Antibiotics) Unknown (comments)       Laboratory report:  Lab Results   Component Value Date/Time    WBC 16.8 (H) 03/19/2022 07:28 PM    HGB 14.7 03/19/2022 07:28 PM    HCT 44.1 03/19/2022 07:28 PM    PLATELET 612 44/62/1447 07:28 PM    MCV 88.9 03/19/2022 07:28 PM      Lab Results   Component Value Date/Time    Sodium 135 (L) 03/19/2022 07:28 PM    Potassium 3.7 03/19/2022 07:28 PM    Chloride 108 03/19/2022 07:28 PM    CO2 23 03/19/2022 07:28 PM    Anion gap 4 (L) 03/19/2022 07:28 PM    Glucose 99 03/19/2022 07:28 PM    BUN 7 03/19/2022 07:28 PM    Creatinine 0.67 03/19/2022 07:28 PM BUN/Creatinine ratio 10 (L) 03/19/2022 07:28 PM    GFR est AA >60 03/19/2022 07:28 PM    GFR est non-AA >60 03/19/2022 07:28 PM    Calcium 9.8 03/19/2022 07:28 PM    Bilirubin, total 0.2 03/19/2022 07:28 PM    Alk. phosphatase 98 03/19/2022 07:28 PM    Protein, total 8.4 (H) 03/19/2022 07:28 PM    Albumin 4.4 03/19/2022 07:28 PM    Globulin 4.0 03/19/2022 07:28 PM    A-G Ratio 1.1 03/19/2022 07:28 PM    ALT (SGPT) 32 03/19/2022 07:28 PM      Vitals:    03/20/22 1324 03/20/22 1639 03/20/22 1956 03/21/22 0816   BP: 131/77 110/73 104/72 121/78   Pulse: (!) 58 81 96 76   Resp: 16 16 16 16   Temp: 97.6 °F (36.4 °C) 98.6 °F (37 °C) 98.7 °F (37.1 °C) 98.5 °F (36.9 °C)   SpO2: 98% 98%  99%   Weight:       Height:           No results found for: VALF2, VALAC, VALP, VALPR, DS6, CRBAM, CRBAMP, CARB2, XCRBAM  No results found for: LITHM    Vital Signs  Patient Vitals for the past 24 hrs:   Temp Pulse Resp BP SpO2   03/21/22 0816 98.5 °F (36.9 °C) 76 16 121/78 99 %   03/20/22 1956 98.7 °F (37.1 °C) 96 16 104/72    03/20/22 1639 98.6 °F (37 °C) 81 16 110/73 98 %   03/20/22 1324 97.6 °F (36.4 °C) (!) 58 16 131/77 98 %     Wt Readings from Last 3 Encounters:   03/20/22 64 kg (141 lb 1.6 oz)     Temp Readings from Last 3 Encounters:   03/21/22 98.5 °F (36.9 °C)     BP Readings from Last 3 Encounters:   03/21/22 121/78     Pulse Readings from Last 3 Encounters:   03/21/22 76       Radiology (reviewed/updated 3/21/2022)  No results found.     Current Facility-Administered Medications   Medication Dose Route Frequency Provider Last Rate Last Admin    OLANZapine (ZyPREXA) tablet 5 mg  5 mg Oral Q6H PRN Nghia Kittredge, NP   5 mg at 03/20/22 1813    haloperidol lactate (HALDOL) injection 5 mg  5 mg IntraMUSCular Q6H PRN Nghia Kittredge, NP        benztropine (COGENTIN) tablet 1 mg  1 mg Oral BID PRN Nghia Kittredge, NP        diphenhydrAMINE (BENADRYL) injection 50 mg  50 mg IntraMUSCular BID PRN Nghia Kittredge, NP        hydrOXYzine HCL (ATARAX) tablet 50 mg  50 mg Oral TID PRN Skylar Rear, NP   50 mg at 03/20/22 1733    LORazepam (ATIVAN) injection 1 mg  1 mg IntraMUSCular Q4H PRN Skylar Rear, NP        traZODone (DESYREL) tablet 50 mg  50 mg Oral QHS PRN Skylar Rear, NP   50 mg at 03/20/22 2221    acetaminophen (TYLENOL) tablet 650 mg  650 mg Oral Q4H PRN Skylar Rear, NP   650 mg at 03/20/22 1691    magnesium hydroxide (MILK OF MAGNESIA) 400 mg/5 mL oral suspension 30 mL  30 mL Oral DAILY PRN Skylar Rear, NP        ARIPiprazole (ABILIFY) tablet 5 mg  5 mg Oral DAILY Januaryard Damieny, NP   5 mg at 03/21/22 0631    nicotine buccal (POLACRILEX) lozenge 2 mg  2 mg Oral Q2H PRN Estuardo Cardoza MD           Side Effects: (reviewed/updated 3/21/2022)  None reported or admitted to. Review of Systems: (reviewed/updated 3/21/2022)  Appetite: good  Sleep: good   All other Review of Systems: negative    Mental Status Exam:  Eye contact: Good eye contact  Psychomotor activity: Relaxed  Speech is spontaneous  Thought process: Logical and goal directed   Mood is \"ok\"  Affect: Blunted  Perception: No avh  Suicidal ideation: passive si-decreasing. Homicidal ideation: No hi  Insight/judgment: Partial   Cognition is grossly intact      Physical Exam:  Musculoskeletal system: steady gait  Tremor not present  Cog wheeling not present      Assessment and Plan:  Cristela Ho meets criteria for a diagnosis of MDD, CIARA, PTSD, rule out mood disorder    Decrease abilify to 2 mg. Start lamictal 25 mg daily. Dc zyprexa prn. Continue rest of medications as prescribed. We will closely monitor for safety. We will encourage reality orientation. Disposition planning to continue.        I certify that this patients inpatient psychiatric hospital services furnished since the previous certification were, and continue to be, required for treatment that could reasonably be expected to improve the patient's condition, or for diagnostic study, and that the patient continues to need, on a daily basis, active treatment furnished directly by or requiring the supervision of inpatient psychiatric facility personnel. In addition, the hospital records show that services furnished were intensive treatment services, admission or related services, or equivalent services.       Signed:  Brian Ma NP  3/21/2022

## 2022-03-21 NOTE — BH NOTES
GROUP THERAPY PROGRESS NOTE    Patient did not participate in psychotherapy group.     Cathy Danielle MSW, [facility]

## 2022-03-21 NOTE — INTERDISCIPLINARY ROUNDS
Behavioral Health Interdisciplinary Rounds     Patient Name: Santino Mario  Age: 27 y.o. Room/Bed:  746/  Primary Diagnosis: <principal problem not specified>   Admission Status: Voluntary     Readmission within 30 days: no  Power of  in place: no  Patient requires a blocked bed: no          Reason for blocked bed:     VTE Prophylaxis: Not indicated    Mobility needs/Fall risk: no  Flu Vaccine :   Nutritional Plan:   Consults:        Labs/Testing due today?: no    Sleep hours: 7+        Participation in Care/Groups:  yes  Medication Compliant?: Yes  PRNS (last 24 hours): Antipsychotic (PO), Antianxiety and Sleep Aid    Restraints (last 24 hours):  no     CIWA (range last 24 hours):     COWS (range last 24 hours):      Alcohol screening (AUDIT) completed -         If applicable, date SBIRT discussed in treatment team AND documented:   AUDIT Screen Score:        Document Brief Intervention (corresponds directly with the 5 A's, Ask, Advise, Assess, Assist, and Arrange): At- Risk Patients (Score 7-15 for women; 8-15 for men)  Discuss concern patient is drinking at unhealthy levels known to increase risk of alcohol-related health problems. Is Patient ready to commit to change? If No:   Encourage reflection   Discuss short term and long term health risks of consuming alcohol   Barriers to change   Reaffirm willingness to help / Educational materials provided  If Yes:   Set goal  [employer] provided    Harmful use or Dependence (Score 16 or greater)   Discuss short term and long term health risks of consuming alcohol   Recommendations   Negotiate drinking goal   Recommend addiction specialist/center   Arrange follow-up appointments.     Tobacco - patient is a smoker: Have You Used Tobacco in the Past 30 Days: Yes  Illegal Drugs use: Have You Used Any Illegal Substances Over the Past 12 Months: Yes    24 hour chart check complete: yes ____________________________________________________________________________________________________________    Patient goal(s) for today:   Treatment team focus/goals:   Progress note     LOS:  2  Expected LOS:     Financial concerns/prescription coverage:    Family contact:     Family requesting physician contact today:    Discharge plan:   Access to weapons :         Outpatient provider(s):   Patient's preferred phone number for follow up call :   Patient's preferred e-mail address :  Participating treatment team members: Bronwyn Connell (assigned SW),

## 2022-03-21 NOTE — PROGRESS NOTES
Laboratory Monitoring for Antipsychotics: This patient is currently prescribed the following medication(s):   Current Facility-Administered Medications   Medication Dose Route Frequency    [START ON 3/22/2022] ARIPiprazole (ABILIFY) tablet 2 mg  2 mg Oral DAILY    lamoTRIgine (LaMICtal) tablet 25 mg  25 mg Oral DAILY     The following labs have been completed for monitoring of antipsychotics and/or mood stabilizers:    Height, Weight, BMI Estimation  Estimated body mass index is 21.45 kg/m² as calculated from the following:    Height as of this encounter: 172.7 cm (68\"). Weight as of this encounter: 64 kg (141 lb 1.6 oz). Vital Signs/Blood Pressure  Visit Vitals  /78 (BP Patient Position: Sitting)   Pulse 76   Temp 98.5 °F (36.9 °C)   Resp 16   Ht 172.7 cm (68\")   Wt 64 kg (141 lb 1.6 oz)   SpO2 99%   BMI 21.45 kg/m²     Renal Function, Hepatic Function and Chemistry  Estimated Creatinine Clearance (by C-G formula based on SCr of 0.67 mg/dL)  Female: 125 mL/min  Male: 147.3 mL/min    Lab Results   Component Value Date/Time    Sodium 135 (L) 03/19/2022 07:28 PM    Potassium 3.7 03/19/2022 07:28 PM    Chloride 108 03/19/2022 07:28 PM    CO2 23 03/19/2022 07:28 PM    Anion gap 4 (L) 03/19/2022 07:28 PM    BUN 7 03/19/2022 07:28 PM    Creatinine 0.67 03/19/2022 07:28 PM    BUN/Creatinine ratio 10 (L) 03/19/2022 07:28 PM    Bilirubin, total 0.2 03/19/2022 07:28 PM    Protein, total 8.4 (H) 03/19/2022 07:28 PM    Albumin 4.4 03/19/2022 07:28 PM    Globulin 4.0 03/19/2022 07:28 PM    A-G Ratio 1.1 03/19/2022 07:28 PM    ALT (SGPT) 32 03/19/2022 07:28 PM    AST (SGOT) 16 03/19/2022 07:28 PM    Alk.  phosphatase 98 03/19/2022 07:28 PM     Lab Results   Component Value Date/Time    Glucose 99 03/19/2022 07:28 PM     No results found for: HBA1C, QFX8GCCQ    Hematology  Lab Results   Component Value Date/Time    WBC 16.8 (H) 03/19/2022 07:28 PM    RBC 4.96 03/19/2022 07:28 PM    HGB 14.7 03/19/2022 07:28 PM HCT 44.1 03/19/2022 07:28 PM    MCV 88.9 03/19/2022 07:28 PM    MCH 29.6 03/19/2022 07:28 PM    MCHC 33.3 03/19/2022 07:28 PM    RDW 11.9 03/19/2022 07:28 PM    PLATELET 904 27/61/3041 07:28 PM     Lipids  No results found for: CHOL, CHOLX, CHLST, CHOLV, 638306, HDL, HDLP, LDL, LDLC, DLDLP, TGLX, TRIGL, TRIGP, CHHD, CHHDX    Thyroid Function  Lab Results   Component Value Date/Time    TSH 0.92 03/19/2022 07:28 PM     Pregnancy Status  Lab Results   Component Value Date/Time    Pregnancy test,urine (POC) Negative 03/19/2022 09:33 PM     Assessment/Plan:  Will order lipid panel and hemoglobin A1c or fasting glucose to complete the recommended baseline laboratory monitoring based on the patient's current medication regimen.         Pasqual Barthel, PHARMD

## 2022-03-21 NOTE — BH NOTES
GROUP THERAPY PROGRESS NOTE     Patient is participating in Healthy living and wellness group.     Group time: 45 minutes     Personal goal for participation: Develop an understanding of sleep hygiene     Goal orientation: Personal     Group therapy participation: Active      Therapeutic interventions reviewed and discussed: Group members were able to develop an understanding of how sleep patterns effect mental health. Members were guided through developing an understanding of sleep hygiene. Members gained insight through discussion about current maladaptive sleep habits and ways to improve sleep quality. Sleep hygiene guideline worksheet provided.     Impression of participation: Pt was present and engaged in group discussion. Pt added insight to group topic. Pts mood was stable. Pt interacted with peers and sw.  Pt was calm, cooperative.         Norma SON, QMHP-A

## 2022-03-21 NOTE — PROGRESS NOTES
Admission Medication Reconciliation:    Information obtained from:  patient interview and [facility]  RxQuery data available¹:  NO    Comments/Recommendations: Updated PTA meds/reviewed patient's allergies. 1)  The patient denies taking any medications prior to admission. They reports that they were on sertraline and bupropion as a teenager. 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 2)  The [place] Prescription Monitoring Program () was assessed to determine fill history of any controlled medications. Unable to locate the patient in the database    3)  Medication changes (since last review): none   ¹RxQuery pharmacy benefit data reflects medications filled and processed through the patient's insurance, however this data does NOT capture whether the medication was picked up or is currently being taken by the patient.     Allergies:  Penicillins and Sulfa (sulfonamide antibiotics)    Significant PMH/Disease States:   Past Medical History:   Diagnosis Date    Anxiety     Depression     PTSD (post-traumatic stress disorder)        Chief Complaint for this Admission:    Chief Complaint   Patient presents with    Suicidal     Prior to Admission Medications:   None     FAITH Armenta

## 2022-03-21 NOTE — PROGRESS NOTES
Problem: Depressed Mood (Adult/Pediatric)  Goal: *STG: Participates in treatment plan  Outcome: Progressing Towards Goal  Variance Patient slowly responding  Pt minimally participates in therapeutic activities. She does verbalize feeling anxious and agitated. Pt accepts atarax 50 mg and 1733. Pt accepts Zyprexa 5 mg at 1813.   T thanked staff and went to her room to rest.  2221 Trazadone 50 mg administered per pt request to promote rest.

## 2022-03-21 NOTE — PROGRESS NOTES
Problem: Falls - Risk of  Goal: *Absence of Falls  Description: Document [place] Fall Risk and appropriate interventions in the flowsheet. 3/20/2022 6008 by Mel Jaimes RN  Outcome: Progressing Towards Goal  Note: Fall Risk Interventions:            Medication Interventions: Teach patient to arise slowly     Patient received resting quietly in bed. No signs of distress. Even and unlabored breathing. Staff will continue to monitor safety q15 and provide support.

## 2022-03-21 NOTE — PROGRESS NOTES
Out in milieu laughing and smiling with select peers. Cooperative. Meal and medication compliant. Problem: Falls - Risk of  Goal: *Absence of Falls  Description: Document [place] Fall Risk and appropriate interventions in the flowsheet.   Outcome: Progressing Towards Goal  Note: Fall Risk Interventions:       Medication Interventions: Teach patient to arise slowly    Problem: Patient Education: Go to Patient Education Activity  Goal: Patient/Family Education  Outcome: Progressing Towards Goal

## 2022-03-22 LAB
CHOLEST SERPL-MCNC: 186 MG/DL
EST. AVERAGE GLUCOSE BLD GHB EST-MCNC: 100 MG/DL
HBA1C MFR BLD: 5.1 % (ref 4–5.6)
HDLC SERPL-MCNC: 40 MG/DL
HDLC SERPL: 4.7 {RATIO} (ref 0–5)
LDLC SERPL CALC-MCNC: 123.2 MG/DL (ref 0–100)
TRIGL SERPL-MCNC: 114 MG/DL (ref ?–150)
VLDLC SERPL CALC-MCNC: 22.8 MG/DL

## 2022-03-22 PROCEDURE — 74011250637 HC RX REV CODE- 250/637: Performed by: NURSE PRACTITIONER

## 2022-03-22 PROCEDURE — 80061 LIPID PANEL: CPT

## 2022-03-22 PROCEDURE — 36415 COLL VENOUS BLD VENIPUNCTURE: CPT

## 2022-03-22 PROCEDURE — 74011250637 HC RX REV CODE- 250/637

## 2022-03-22 PROCEDURE — 83036 HEMOGLOBIN GLYCOSYLATED A1C: CPT

## 2022-03-22 PROCEDURE — 65220000003 HC RM SEMIPRIVATE PSYCH

## 2022-03-22 PROCEDURE — 74011250637 HC RX REV CODE- 250/637: Performed by: PSYCHIATRY & NEUROLOGY

## 2022-03-22 RX ADMIN — NICOTINE POLACRILEX 2 MG: 2 LOZENGE ORAL at 08:32

## 2022-03-22 RX ADMIN — ACETAMINOPHEN 650 MG: 325 TABLET ORAL at 08:31

## 2022-03-22 RX ADMIN — TRAZODONE HYDROCHLORIDE 50 MG: 50 TABLET ORAL at 22:39

## 2022-03-22 RX ADMIN — ARIPIPRAZOLE 2 MG: 2 TABLET ORAL at 08:30

## 2022-03-22 RX ADMIN — Medication 1 LOZENGE: at 08:32

## 2022-03-22 RX ADMIN — HYDROXYZINE HYDROCHLORIDE 50 MG: 50 TABLET, FILM COATED ORAL at 21:10

## 2022-03-22 RX ADMIN — NICOTINE POLACRILEX 2 MG: 2 LOZENGE ORAL at 17:12

## 2022-03-22 RX ADMIN — LAMOTRIGINE 25 MG: 25 TABLET ORAL at 08:30

## 2022-03-22 NOTE — BH NOTES
Behavioral Health Interdisciplinary Note     Patient goal(s) for today: Participate in groups and voice concerns to staff. Treatment team focus/goals: Treatment and discharge planning. Progress note: Im OK now, just had a scary dream last night. Pt reports mood is drastically improved. Pt reports they have been attending some groups and using some coping skills such as deep breathing when feeling anxious. Pt reports they are going to arrange for transportation with a friend. Pt reports no SI. Discharge tomorrow. LOS:  3                       Expected LOS:      Financial concerns/prescription coverage: [employer] cross blue shield. Family contact:   Yvette Pizano, father [phone] (SIGNED YONY)                   Family requesting physician contact today:  none  Discharge plan: Pt will return home after discharge. Access to weapons : none reported. Outpatient provider(s):   - Cristy Lorenz, [employer], counselor, [phone]   Appointment 3/24 @9AM, virtual.  - Laura Thao [facility], [facility], [phone]   Appointment - 3/31 @3/31, virtual.    Patient's preferred phone number for follow up call : [phone]  Patient's preferred e-mail address : [email or web address]  Participating treatment team members: Prema Messina;  [address] [employer] [facility], MSW Intern; ANAND Yousif; John Sparks NP; Caitie CUEVAS; Naheed Hsu NP.

## 2022-03-22 NOTE — BH NOTES
GROUP THERAPY PROGRESS NOTE    Patient did not participate in recreational therapy group.     ADELAIDA Santiago, QMHP-A

## 2022-03-22 NOTE — PROGRESS NOTES
Problem: Depressed Mood (Adult/Pediatric)  Goal: *STG: Participates in treatment plan  Outcome: Progressing Towards Goal  Note: Up in bed, resting reports poor sleep related to waking up at 2100 due to noise level on unit that lead to her feeling fearful and having racing thoughts. Describes moments that she was feeling like she was asleep but also awake having night ricci. Denies SI, no self harming behaviors. States goal for the day is to rest this am, attend group this afternoon.  Staff focus is on offering support  Goal: *STG: Verbalizes anger, guilt, and other feelings in a constructive manor  Outcome: Progressing Towards Goal  Goal: *STG: Attends activities and groups  Outcome: Progressing Towards Goal  Goal: *STG: Complies with medication therapy  Outcome: Progressing Towards Goal  Goal: Interventions  Outcome: Progressing Towards Goal

## 2022-03-22 NOTE — PROGRESS NOTES
Laboratory Monitoring for Antipsychotics: This patient is currently prescribed the following medication(s):   Current Facility-Administered Medications   Medication Dose Route Frequency    ARIPiprazole (ABILIFY) tablet 2 mg  2 mg Oral DAILY    lamoTRIgine (LaMICtal) tablet 25 mg  25 mg Oral DAILY     The following labs have been completed for monitoring of antipsychotics and/or mood stabilizers:    Height, Weight, BMI Estimation  Estimated body mass index is 21.45 kg/m² as calculated from the following:    Height as of this encounter: 172.7 cm (68\"). Weight as of this encounter: 64 kg (141 lb 1.6 oz). Vital Signs/Blood Pressure  Visit Vitals  /72   Pulse 75   Temp 97.7 °F (36.5 °C)   Resp 16   Ht 172.7 cm (68\")   Wt 64 kg (141 lb 1.6 oz)   SpO2 100%   BMI 21.45 kg/m²     Renal Function, Hepatic Function and Chemistry  Estimated Creatinine Clearance (by C-G formula based on SCr of 0.67 mg/dL)  Female: 125 mL/min  Male: 147.3 mL/min    Lab Results   Component Value Date/Time    Sodium 135 (L) 03/19/2022 07:28 PM    Potassium 3.7 03/19/2022 07:28 PM    Chloride 108 03/19/2022 07:28 PM    CO2 23 03/19/2022 07:28 PM    Anion gap 4 (L) 03/19/2022 07:28 PM    BUN 7 03/19/2022 07:28 PM    Creatinine 0.67 03/19/2022 07:28 PM    BUN/Creatinine ratio 10 (L) 03/19/2022 07:28 PM    Bilirubin, total 0.2 03/19/2022 07:28 PM    Protein, total 8.4 (H) 03/19/2022 07:28 PM    Albumin 4.4 03/19/2022 07:28 PM    Globulin 4.0 03/19/2022 07:28 PM    A-G Ratio 1.1 03/19/2022 07:28 PM    ALT (SGPT) 32 03/19/2022 07:28 PM    AST (SGOT) 16 03/19/2022 07:28 PM    Alk.  phosphatase 98 03/19/2022 07:28 PM     Lab Results   Component Value Date/Time    Glucose 99 03/19/2022 07:28 PM     Lab Results   Component Value Date/Time    Hemoglobin A1c 5.1 03/22/2022 01:51 AM     Hematology  Lab Results   Component Value Date/Time    WBC 16.8 (H) 03/19/2022 07:28 PM    RBC 4.96 03/19/2022 07:28 PM    HGB 14.7 03/19/2022 07:28 PM    HCT 44.1 03/19/2022 07:28 PM    MCV 88.9 03/19/2022 07:28 PM    MCH 29.6 03/19/2022 07:28 PM    MCHC 33.3 03/19/2022 07:28 PM    RDW 11.9 03/19/2022 07:28 PM    PLATELET 147 37/58/3047 07:28 PM     Lipids  Lab Results   Component Value Date/Time    Cholesterol, total 186 03/22/2022 01:51 AM    HDL Cholesterol 40 03/22/2022 01:51 AM    LDL, calculated 123.2 (H) 03/22/2022 01:51 AM    Triglyceride 114 03/22/2022 01:51 AM    CHOL/HDL Ratio 4.7 03/22/2022 01:51 AM     Thyroid Function  Lab Results   Component Value Date/Time    TSH 0.92 03/19/2022 07:28 PM     Pregnancy Status  Lab Results   Component Value Date/Time    Pregnancy test,urine (POC) Negative 03/19/2022 09:33 PM     Assessment/Plan:  Recommended baseline laboratory monitoring has been completed based on this patient's current medication regimen. No further interventions are needed at this time. Follow-up metabolic monitoring labs should be completed in 3 months (quarterly for the first year of antipsychotic therapy).      Nila Souza, PHARMD

## 2022-03-22 NOTE — PROGRESS NOTES
Problem: Depressed Mood (Adult/Pediatric)  Goal: *STG: Participates in treatment plan  Outcome: Progressing Towards Goal  Goal: *STG: Verbalizes anger, guilt, and other feelings in a constructive manor  Outcome: Progressing Towards Goal  Goal: *STG: Attends activities and groups  Outcome: Progressing Towards Goal  Goal: *STG: Remains safe in hospital  Outcome: Progressing Towards Goal  Goal: *STG: Complies with medication therapy  Outcome: Progressing Towards Goal  Goal: Interventions  Outcome: Progressing Towards Goal   Pt is visible on unit  No voiced complaints or acute distress at present

## 2022-03-22 NOTE — BH NOTES
PSYCHIATRIC PROGRESS NOTE       Patient: Ibrahima Schulz MRN: [id number]  SSN: [id number]    YOB: 1995  Age: 27 y.o. Sex: adult      Admit Date: 3/19/2022    LOS: 3 days       Chief Complaint:  I am feeling well. Interval History:  Ibrahima Schulz \"Bee\" says they are feeling better. Denies si hi or hallucinations. Did not sleep well last night due to anxiety from having weird dreams. They are out in the unit, attending groups. CM spoke to dad and has no safety concerns. Denies any rash from lamictal. At the present time the patient Ibrahima Schulz remains compliant with taking medications. Denies any adverse events from taking them and feels they have been beneficial. Calm and pleasant. Past Medical History:  Past Medical History:   Diagnosis Date    Anxiety     Depression     PTSD (post-traumatic stress disorder)          ALLERGIES:(reviewed/updated 3/22/2022)  Allergies   Allergen Reactions    Penicillins Other (comments)    Sulfa (Sulfonamide Antibiotics) Unknown (comments)       Laboratory report:  Lab Results   Component Value Date/Time    WBC 16.8 (H) 03/19/2022 07:28 PM    HGB 14.7 03/19/2022 07:28 PM    HCT 44.1 03/19/2022 07:28 PM    PLATELET 039 48/96/9843 07:28 PM    MCV 88.9 03/19/2022 07:28 PM      Lab Results   Component Value Date/Time    Sodium 135 (L) 03/19/2022 07:28 PM    Potassium 3.7 03/19/2022 07:28 PM    Chloride 108 03/19/2022 07:28 PM    CO2 23 03/19/2022 07:28 PM    Anion gap 4 (L) 03/19/2022 07:28 PM    Glucose 99 03/19/2022 07:28 PM    BUN 7 03/19/2022 07:28 PM    Creatinine 0.67 03/19/2022 07:28 PM    BUN/Creatinine ratio 10 (L) 03/19/2022 07:28 PM    GFR est AA >60 03/19/2022 07:28 PM    GFR est non-AA >60 03/19/2022 07:28 PM    Calcium 9.8 03/19/2022 07:28 PM    Bilirubin, total 0.2 03/19/2022 07:28 PM    Alk.  phosphatase 98 03/19/2022 07:28 PM    Protein, total 8.4 (H) 03/19/2022 07:28 PM    Albumin 4.4 03/19/2022 07:28 PM    Globulin 4.0 03/19/2022 07:28 PM    A-G Ratio 1.1 03/19/2022 07:28 PM    ALT (SGPT) 32 03/19/2022 07:28 PM      Vitals:    03/21/22 1159 03/21/22 1602 03/21/22 2046 03/22/22 0829   BP: 112/75 133/81 (!) 129/91 (!) 145/77   Pulse: 84 74 88 98   Resp: 16 16 16 16   Temp: 97.5 °F (36.4 °C) 97.9 °F (36.6 °C) 97.8 °F (36.6 °C) 97.8 °F (36.6 °C)   SpO2: 97% 98% 98% 98%   Weight:       Height:           No results found for: VALF2, VALAC, VALP, VALPR, DS6, CRBAM, CRBAMP, CARB2, XCRBAM  No results found for: LITHM    Vital Signs  Patient Vitals for the past 24 hrs:   Temp Pulse Resp BP SpO2   03/22/22 0829 97.8 °F (36.6 °C) 98 16 (!) 145/77 98 %   03/21/22 2046 97.8 °F (36.6 °C) 88 16 (!) 129/91 98 %   03/21/22 1602 97.9 °F (36.6 °C) 74 16 133/81 98 %   03/21/22 1159 97.5 °F (36.4 °C) 84 16 112/75 97 %     Wt Readings from Last 3 Encounters:   03/20/22 64 kg (141 lb 1.6 oz)     Temp Readings from Last 3 Encounters:   03/22/22 97.8 °F (36.6 °C)     BP Readings from Last 3 Encounters:   03/22/22 (!) 145/77     Pulse Readings from Last 3 Encounters:   03/22/22 98       Radiology (reviewed/updated 3/22/2022)  No results found.     Current Facility-Administered Medications   Medication Dose Route Frequency Provider Last Rate Last Admin    benzocaine-menthoL (CEPACOL) lozenge 1 Lozenge  1 Lozenge Mucous Membrane PRN Loida Schneider NP   1 Lozenge at 03/22/22 0832    ARIPiprazole (ABILIFY) tablet 2 mg  2 mg Oral DAILY Loida Schneider NP   2 mg at 03/22/22 0830    lamoTRIgine (LaMICtal) tablet 25 mg  25 mg Oral DAILY Loida Schneider NP   25 mg at 03/22/22 0830    hydrOXYzine HCL (ATARAX) tablet 50 mg  50 mg Oral Q6H PRN Loida Schneider NP   50 mg at 03/21/22 2345    haloperidol lactate (HALDOL) injection 5 mg  5 mg IntraMUSCular Q6H PRN Temitope [place], NP        benztropine (COGENTIN) tablet 1 mg  1 mg Oral BID PRN Temitope [place], NP        diphenhydrAMINE (BENADRYL) injection 50 mg  50 mg IntraMUSCular BID PRN Temitope [place], NP       [facility] LORazepam (ATIVAN) injection 1 mg  1 mg IntraMUSCular Q4H PRN Shirline Sieving, NP        traZODone (DESYREL) tablet 50 mg  50 mg Oral QHS PRN Shirline Sieving, NP   50 mg at 03/21/22 2056    acetaminophen (TYLENOL) tablet 650 mg  650 mg Oral Q4H PRN Shirline Sieving, NP   650 mg at 03/22/22 0831    magnesium hydroxide (MILK OF MAGNESIA) 400 mg/5 mL oral suspension 30 mL  30 mL Oral DAILY PRN Shirline Sieving, NP        nicotine buccal (POLACRILEX) lozenge 2 mg  2 mg Oral Q2H PRN Joce Lowe MD   2 mg at 03/22/22 7315       Side Effects: (reviewed/updated 3/22/2022)  None reported or admitted to. Review of Systems: (reviewed/updated 3/22/2022)  Appetite: good  Sleep: good   All other Review of Systems: negative    Mental Status Exam:  Eye contact: Good eye contact  Psychomotor activity: Relaxed  Speech is spontaneous  Thought process: Logical and goal directed   Mood is \"good\"  Affect: full  Perception: No avh  Suicidal ideation: denies si  Homicidal ideation: No hi  Insight/judgment: Partial   Cognition is grossly intact      Physical Exam:  Musculoskeletal system: steady gait  Tremor not present  Cog wheeling not present      Assessment and Plan:  Mercedez Mata meets criteria for a diagnosis of MDD, CIARA, PTSD, rule out mood disorder      Continue current medications as prescribed. We will closely monitor for safety. We will encourage reality orientation. Plan for dc in am.       I certify that this patients inpatient psychiatric hospital services furnished since the previous certification were, and continue to be, required for treatment that could reasonably be expected to improve the patient's condition, or for diagnostic study, and that the patient continues to need, on a daily basis, active treatment furnished directly by or requiring the supervision of inpatient psychiatric facility personnel.  In addition, the hospital records show that services furnished were intensive treatment services, admission or related services, or equivalent services.       Signed:  Brian Ma NP  3/22/2022

## 2022-03-22 NOTE — PROGRESS NOTES
Problem: Falls - Risk of  Goal: *Absence of Falls  Description: Document Brianna Ground Fall Risk and appropriate interventions in the flowsheet.   Outcome: Progressing Towards Goal  Note: Fall Risk Interventions:            Medication Interventions: Teach patient to arise slowly          Pt appears asleep in bed, NAD, even respirations, will continue to monitor q15min

## 2022-03-22 NOTE — PROGRESS NOTES
Problem: Discharge Planning  Goal: *Discharge to safe environment  Outcome: Progressing Towards Goal  Note: Pt will return home upon discharge. Goal: *Knowledge of medication management  Outcome: Progressing Towards Goal  Note: Pt is compliant with prescribed medications. Goal: *Knowledge of discharge instructions  Outcome: Progressing Towards Goal  Note: Pt is participating in their discharge planning.

## 2022-03-22 NOTE — INTERDISCIPLINARY ROUNDS
Behavioral Health Interdisciplinary Rounds     Patient Name: Belia Amador  Age: 27 y.o. Room/Bed:  746/  Primary Diagnosis: <principal problem not specified>   Admission Status: Voluntary     Readmission within 30 days: no  Power of  in place: no  Patient requires a blocked bed: yes          Reason for blocked bed: transgender    VTE Prophylaxis: No    Mobility needs/Fall risk: no  Flu Vaccine : no   Nutritional Plan: no  Consults:          Labs/Testing due today?: yes    Sleep hours: 3       Participation in Care/Groups:  no  Medication Compliant?: Yes  PRNS (last 24 hours): Antianxiety, Sleep Aid, Pain and nicotine withdrawal    Restraints (last 24 hours):  no     CIWA (range last 24 hours):     COWS (range last 24 hours):      Alcohol screening (AUDIT) completed -         If applicable, date SBIRT discussed in treatment team AND documented:   AUDIT Screen Score:        Document Brief Intervention (corresponds directly with the 5 A's, Ask, Advise, Assess, Assist, and Arrange): At- Risk Patients (Score 7-15 for women; 8-15 for men)  Discuss concern patient is drinking at unhealthy levels known to increase risk of alcohol-related health problems. Is Patient ready to commit to change? If No:   Encourage reflection   Discuss short term and long term health risks of consuming alcohol   Barriers to change   Reaffirm willingness to help / Educational materials provided  If Yes:   Set goal  [employer] provided    Harmful use or Dependence (Score 16 or greater)   Discuss short term and long term health risks of consuming alcohol   Recommendations   Negotiate drinking goal   Recommend addiction specialist/center   Arrange follow-up appointments.     Tobacco - patient is a smoker: Have You Used Tobacco in the Past 30 Days: Yes  Illegal Drugs use: Have You Used Any Illegal Substances Over the Past 12 Months: Yes    24 hour chart check complete: yes ____________________________________________________________________________________________________________    Patient goal(s) for today:   Treatment team focus/goals:   Progress note     LOS:  3  Expected LOS:     Financial concerns/prescription coverage:    Family contact:       Family requesting physician contact today:    Discharge plan:   Access to weapons :         Outpatient provider(s):   Patient's preferred phone number for follow up call :   Patient's preferred e-mail address :  Participating treatment team members: Bronwyn Connell (assigned SW),

## 2022-03-23 VITALS
DIASTOLIC BLOOD PRESSURE: 78 MMHG | HEIGHT: 68 IN | WEIGHT: 141.1 LBS | BODY MASS INDEX: 21.38 KG/M2 | OXYGEN SATURATION: 99 % | HEART RATE: 82 BPM | SYSTOLIC BLOOD PRESSURE: 109 MMHG | TEMPERATURE: 97.6 F | RESPIRATION RATE: 16 BRPM

## 2022-03-23 PROCEDURE — 74011250637 HC RX REV CODE- 250/637

## 2022-03-23 PROCEDURE — 74011250637 HC RX REV CODE- 250/637: Performed by: PSYCHIATRY & NEUROLOGY

## 2022-03-23 PROCEDURE — 74011250637 HC RX REV CODE- 250/637: Performed by: NURSE PRACTITIONER

## 2022-03-23 RX ORDER — HYDROXYZINE 50 MG/1
50 TABLET, FILM COATED ORAL
Qty: 30 TABLET | Refills: 0 | Status: SHIPPED | OUTPATIENT
Start: 2022-03-23

## 2022-03-23 RX ORDER — LAMOTRIGINE 25 MG/1
25 TABLET ORAL DAILY
Qty: 30 TABLET | Refills: 0 | Status: SHIPPED | OUTPATIENT
Start: 2022-03-24

## 2022-03-23 RX ORDER — TRAZODONE HYDROCHLORIDE 50 MG/1
50 TABLET ORAL
Qty: 30 TABLET | Refills: 0 | Status: SHIPPED | OUTPATIENT
Start: 2022-03-23

## 2022-03-23 RX ORDER — ARIPIPRAZOLE 2 MG/1
2 TABLET ORAL DAILY
Qty: 30 TABLET | Refills: 0 | Status: SHIPPED | OUTPATIENT
Start: 2022-03-24

## 2022-03-23 RX ADMIN — ACETAMINOPHEN 650 MG: 325 TABLET ORAL at 09:17

## 2022-03-23 RX ADMIN — NICOTINE POLACRILEX 2 MG: 2 LOZENGE ORAL at 09:18

## 2022-03-23 RX ADMIN — ARIPIPRAZOLE 2 MG: 2 TABLET ORAL at 09:18

## 2022-03-23 RX ADMIN — NICOTINE POLACRILEX 2 MG: 2 LOZENGE ORAL at 13:21

## 2022-03-23 RX ADMIN — ACETAMINOPHEN 650 MG: 325 TABLET ORAL at 13:21

## 2022-03-23 RX ADMIN — LAMOTRIGINE 25 MG: 25 TABLET ORAL at 09:18
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
## 2022-03-23 NOTE — PROGRESS NOTES
Pharmacist Discharge Medication Reconciliation    Discharging Provider: Chris Swenson NP    Significant PMH:   Past Medical History:   Diagnosis Date    Anxiety     Depression     PTSD (post-traumatic stress disorder)      Chief Complaint for this Admission:   Chief Complaint   Patient presents with    Suicidal     Allergies: Penicillins and Sulfa (sulfonamide antibiotics)    Discharge Medications:   Current Discharge Medication List        START taking these medications    Details   ARIPiprazole (ABILIFY) 2 mg tablet Take 1 Tablet by mouth daily. Indications: additional treatment for major depressive disorder  Qty: 30 Tablet, Refills: 0  Start date: 3/24/2022      hydrOXYzine HCL (ATARAX) 50 mg tablet Take 1 Tablet by mouth every six (6) hours as needed for Anxiety (agitiation). Indications: anxious  Qty: 30 Tablet, Refills: 0  Start date: 3/23/2022      lamoTRIgine (LaMICtal) 25 mg tablet Take 1 Tablet by mouth daily. Indications: depression  Qty: 30 Tablet, Refills: 0  Start date: 3/24/2022      traZODone (DESYREL) 50 mg tablet Take 1 Tablet by mouth nightly as needed for Sleep (For insomnia).  Indications: insomnia associated with depression  Qty: 30 Tablet, Refills: 0  Start date: 3/23/2022           The patient's chart, MAR and AVS were reviewed by Morenita Agudelo, RYANNED.

## 2022-03-23 NOTE — PROGRESS NOTES
Problem: Falls - Risk of  Goal: *Absence of Falls  Description: Document Re Orantes Fall Risk and appropriate interventions in the flowsheet. Outcome: Progressing Towards Goal  Note: Fall Risk Interventions:  Mobility Interventions: Assess mobility with egress test         Medication Interventions: Teach patient to arise slowly    Elimination Interventions:  Toilet paper/wipes in reach    History of Falls Interventions: Door open when patient unattended         Problem: Depressed Mood (Adult/Pediatric)  Goal: *STG: Participates in treatment plan  Outcome: Progressing Towards Goal  Goal: *STG: Verbalizes anger, guilt, and other feelings in a constructive manor  Outcome: Progressing Towards Goal  Goal: *STG: Attends activities and groups  Outcome: Progressing Towards Goal    Blocked Bed Documentation:    Room number: 746  Type: Behavior  Rationale: Transgender  Anticipated duration: Hospital Duration  Additional comments:

## 2022-03-23 NOTE — BH NOTES
Behavioral Health Transition Record to Provider    Patient Name: Eleazar Vargas  YOB: 1995  Medical Record Number: [id number]  Date of Admission: 3/19/2022  Date of Discharge: 3/23/2022    Attending Provider: No att. providers found  Discharging Provider: Saul Deluca. CRISTINO Schneider  To contact this individual call [phone] and ask the  to page. If unavailable, ask to be transferred to [address] [place] [address] Provider on call. [facility] Provider will be available on call 24/7 and during holidays. Primary Care Provider: No primary care provider on file. Allergies   Allergen Reactions    Penicillins Other (comments)    Sulfa (Sulfonamide Antibiotics) Unknown (comments)       Reason for Admission: CHIEF COMPLAINT: I want to kill myself.            HISTORY OF PRESENTING COMPLAINT:  Kate Stewart is a 27 y.o. UNAVAILABLE adult who is currently admitted to the psychiatric floor at [facility].   Patient presented to ER with friend with reports of suicidal ideation. Stated that there is a  history of mood and anxiety issues dating back to childhood. Pt. Reports that they moved to [place] approximately 6 months with a good friend \"Francia\" but that this relationship became very controlling. Ultimately began having more conflict in relationship and when patient had to travel back to [place] for foot/ankle surgery, the 2 of them took a break. During this time patient says that [address] [place] [address] untrue rumors causing additional conflict with others that the patient is friends with.    Patient states that conflicts are causing patient to have increased depression and suicidal ideations, stating that \"anytime I see something I can use, a rope, a wire, the bathtub, a socket; I just want to kill myself  Patient reports decreased sleep and appetite and reports smoking marijuana and cigarettes daily.     Admission Diagnosis: PTSD (post-traumatic stress disorder) [F43.10]    * No surgery found *    Results for orders placed or performed during the hospital encounter of 03/19/22   URINE CULTURE HOLD SAMPLE    Specimen: Serum; Urine   Result Value Ref Range    Urine culture hold        Urine on hold in Microbiology dept for 2 days. If unpreserved urine is submitted, it cannot be used for addtional testing after 24 hours, recollection will be required. COVID-19 WITH INFLUENZA A/B   Result Value Ref Range    SARS-CoV-2 by PCR Not detected NOTD      Influenza A by PCR Not detected NOTD      Influenza B by PCR Not detected NOTD     COVID-19 WITH INFLUENZA A/B   Result Value Ref Range    SARS-CoV-2 by PCR Not detected NOTD      Influenza A by PCR Not detected NOTD      Influenza B by PCR Not detected NOTD     CBC WITH AUTOMATED DIFF   Result Value Ref Range    WBC 16.8 (H) 3.6 - 11.0 K/uL    RBC 4.96 3.80 - 5.20 M/uL    HGB 14.7 11.5 - 16.0 g/dL    HCT 44.1 35.0 - 47.0 %    MCV 88.9 80.0 - 99.0 FL    MCH 29.6 26.0 - 34.0 PG    MCHC 33.3 30.0 - 36.5 g/dL    RDW 11.9 11.5 - 14.5 %    PLATELET 128 085 - 886 K/uL    MPV 10.5 8.9 - 12.9 FL    NRBC 0.0 0  WBC    ABSOLUTE NRBC 0.00 0.00 - 0.01 K/uL    NEUTROPHILS 81 (H) 32 - 75 %    LYMPHOCYTES 12 12 - 49 %    MONOCYTES 6 5 - 13 %    EOSINOPHILS 0 0 - 7 %    BASOPHILS 0 0 - 1 %    IMMATURE GRANULOCYTES 1 (H) 0.0 - 0.5 %    ABS. NEUTROPHILS 13.6 (H) 1.8 - 8.0 K/UL    ABS. LYMPHOCYTES 2.0 0.8 - 3.5 K/UL    ABS. MONOCYTES 1.0 0.0 - 1.0 K/UL    ABS. EOSINOPHILS 0.1 0.0 - 0.4 K/UL    ABS. BASOPHILS 0.1 0.0 - 0.1 K/UL    ABS. IMM.  GRANS. 0.1 (H) 0.00 - 0.04 K/UL    DF AUTOMATED     METABOLIC PANEL, COMPREHENSIVE   Result Value Ref Range    Sodium 135 (L) 136 - 145 mmol/L    Potassium 3.7 3.5 - 5.1 mmol/L    Chloride 108 97 - 108 mmol/L    CO2 23 21 - 32 mmol/L    Anion gap 4 (L) 5 - 15 mmol/L    Glucose 99 65 - 100 mg/dL    BUN 7 6 - 20 MG/DL    Creatinine 0.67 0.55 - 1.02 MG/DL    BUN/Creatinine ratio 10 (L) 12 - 20      GFR est AA >60 >60 ml/min/1.73m2    GFR est non-AA >60 >60 ml/min/1.73m2    Calcium 9.8 8.5 - 10.1 MG/DL    Bilirubin, total 0.2 0.2 - 1.0 MG/DL    ALT (SGPT) 32 12 - 78 U/L    AST (SGOT) 16 15 - 37 U/L    Alk. phosphatase 98 45 - 117 U/L    Protein, total 8.4 (H) 6.4 - 8.2 g/dL    Albumin 4.4 3.5 - 5.0 g/dL    Globulin 4.0 2.0 - 4.0 g/dL    A-G Ratio 1.1 1.1 - 2.2     ETHYL ALCOHOL   Result Value Ref Range    ALCOHOL(ETHYL),SERUM <10 <10 MG/DL   DRUG SCREEN, URINE   Result Value Ref Range    AMPHETAMINES Negative NEG      BARBITURATES Negative NEG      BENZODIAZEPINES Negative NEG      COCAINE Negative NEG      METHADONE Negative NEG      OPIATES Negative NEG      PCP(PHENCYCLIDINE) Negative NEG      THC (TH-CANNABINOL) Positive (A) NEG      Drug screen comment (NOTE)    URINALYSIS W/MICROSCOPIC   Result Value Ref Range    Color YELLOW/STRAW      Appearance CLEAR CLEAR      Specific gravity 1.005 1.003 - 1.030      pH (UA) 7.0 5.0 - 8.0      Protein Negative NEG mg/dL    Glucose Negative NEG mg/dL    Ketone Negative NEG mg/dL    Bilirubin Negative NEG      Blood Negative NEG      Urobilinogen 0.2 0.2 - 1.0 EU/dL    Nitrites Negative NEG      Leukocyte Esterase Negative NEG      WBC 0-4 0 - 4 /hpf    RBC 0-5 0 - 5 /hpf    Epithelial cells FEW FEW /lpf    Bacteria Negative NEG /hpf    Hyaline cast 0-2 0 - 5 /lpf   ACETAMINOPHEN   Result Value Ref Range    Acetaminophen level <2 (L) 10 - 30 ug/mL   SALICYLATE   Result Value Ref Range    Salicylate level <6.9 (L) 2.8 - 20.0 MG/DL   SAMPLES BEING HELD   Result Value Ref Range    SAMPLES BEING HELD 1red     COMMENT        Add-on orders for these samples will be processed based on acceptable specimen integrity and analyte stability, which may vary by analyte.    HCG QL SERUM   Result Value Ref Range    HCG, Ql. Negative NEG     TSH 3RD GENERATION   Result Value Ref Range    TSH 0.92 0.36 - 3.74 uIU/mL   HEMOGLOBIN A1C WITH EAG   Result Value Ref Range    Hemoglobin A1c 5.1 4.0 - 5.6 %    Est. average glucose 100 mg/dL   LIPID PANEL   Result Value Ref Range    Cholesterol, total 186 <200 MG/DL    Triglyceride 114 <150 MG/DL    HDL Cholesterol 40 MG/DL    LDL, calculated 123.2 (H) 0 - 100 MG/DL    VLDL, calculated 22.8 MG/DL    CHOL/HDL Ratio 4.7 0.0 - 5.0     HCG URINE, QL. - POC   Result Value Ref Range    Pregnancy test,urine (POC) Negative NEG         Immunizations administered during this encounter: There is no immunization history on file for this patient. Screening for Metabolic Disorders for Patients on Antipsychotic Medications  (Data obtained from the EMR)    Estimated Body Mass Index  Estimated body mass index is 21.45 kg/m² as calculated from the following:    Height as of this encounter: 5' 8\" (1.727 m). Weight as of this encounter: 64 kg (141 lb 1.6 oz). Vital Signs/Blood Pressure  Visit Vitals  /78   Pulse 82   Temp 97.6 °F (36.4 °C)   Resp 16   Ht 5' 8\" (1.727 m)   Wt 64 kg (141 lb 1.6 oz)   SpO2 99%   BMI 21.45 kg/m²       Blood Glucose/Hemoglobin A1c  Lab Results   Component Value Date/Time    Glucose 99 03/19/2022 07:28 PM       Lab Results   Component Value Date/Time    Hemoglobin A1c 5.1 03/22/2022 01:51 AM        Lipid Panel  Lab Results   Component Value Date/Time    Cholesterol, total 186 03/22/2022 01:51 AM    HDL Cholesterol 40 03/22/2022 01:51 AM    LDL, calculated 123.2 (H) 03/22/2022 01:51 AM    Triglyceride 114 03/22/2022 01:51 AM    CHOL/HDL Ratio 4.7 03/22/2022 01:51 AM        Discharge Diagnosis: Unspecified mood disorder. Discharge Plan: The patient Mendoza Lamp exhibits the ability to control behavior in a less restrictive environment. Patient's level of functioning is improving. No assaultive/destructive behavior has been observed for the past 24 hours. No suicidal/homicidal threat or behavior has been observed for the past 24 hours. There is no evidence of serious medication side effects.   Patient has not been in physical or protective restraints for at least the past 24 hours. If weapons involved, how are they secured? NA    Is patient aware of and in agreement with discharge plan? yes    Arrangements for medication:  Prescriptions sent to pt pharmacy    Copy of discharge instructions to provider?:  yes    Arrangements for transportation home:  Friends picking up after 1600    Keep all follow up appointments as scheduled, continue to take prescribed medications per physician instructions. Mental health crisis number:  [phone] or your local mental health crisis line number at [facility] at [address] WARM [address]      [phone] ([phone])      M-F: 9am to 9pm      Sat & Sun: 5pm  9pm  National suicide prevention lines:                             0-039-YPZPLTH ([phone])       5-596-404-TALK ([phone])   24/7 Crisis Text Line:  Text HOME to [phone]    Discharge Medication List and Instructions:   Discharge Medication List as of 3/23/2022  2:16 PM      START taking these medications    Details   ARIPiprazole (ABILIFY) 2 mg tablet Take 1 Tablet by mouth daily. Indications: additional treatment for major depressive disorder, Normal, Disp-30 Tablet, R-0      hydrOXYzine HCL (ATARAX) 50 mg tablet Take 1 Tablet by mouth every six (6) hours as needed for Anxiety (agitiation). Indications: anxious, Normal, Disp-30 Tablet, R-0      lamoTRIgine (LaMICtal) 25 mg tablet Take 1 Tablet by mouth daily. Indications: depression, Normal, Disp-30 Tablet, R-0      traZODone (DESYREL) 50 mg tablet Take 1 Tablet by mouth nightly as needed for Sleep (For insomnia). Indications: insomnia associated with depression, Normal, Disp-30 Tablet, R-0             Unresulted Labs (24h ago, onward)            None        To obtain results of studies pending at discharge, please contact [phone]    Follow-up Information     Follow up With Specialties Details Why Contact Info    [facility]   On 3/31/2022 10 AM, virtual, for medication management assessment.  [address] [facility], [address] [place] [address]   Phone#: [phone]  FAX#: [phone]    Dilan Ybarra  On 3/24/2022 9 AM, virtual, for counseling. [address]          Advanced Directive:   Does the patient have an appointed surrogate decision maker? No  Does the patient have a Medical Advance Directive? No  Does the patient have a Psychiatric Advance Directive? No  If the patient does not have a surrogate or Medical Advance Directive AND Psychiatric Advance Directive, the patient was offered information on these advance directives Patient declined to complete    Patient Instructions: Please continue all medications until otherwise directed by physician. Tobacco Cessation Discharge Plan:   Is the patient a smoker and needs referral for smoking cessation? Yes  Patient referred to the following for smoking cessation with an appointment? Refused     Patient was offered medication to assist with smoking cessation at discharge? Refused  Was education for smoking cessation added to the discharge instructions? Yes    Alcohol/Substance Abuse Discharge Plan:   Does the patient have a history of substance/alcohol abuse and requires a referral for treatment? No  Patient referred to the following for substance/alcohol abuse treatment with an appointment? Not applicable  Patient was offered medication to assist with alcohol cessation at discharge? Not applicable  Was education for substance/alcohol abuse added to discharge instructions? Not applicable    Patient discharged to Home; discussed with patient/caregiver and provided to the patient/caregiver either in hard copy or electronically.

## 2022-03-23 NOTE — PROGRESS NOTES
Problem: Falls - Risk of  Goal: *Absence of Falls  Description: Document Diana Mueller Fall Risk and appropriate interventions in the flowsheet. Outcome: Progressing Towards Goal  Note: Fall Risk Interventions:  Mobility Interventions: Assess mobility with egress test         Medication Interventions: Teach patient to arise slowly    Elimination Interventions: Toilet paper/wipes in reach    History of Falls Interventions: Door open when patient unattended       Patient received resting quietly in bed. No signs of distress. Even and unlabored breathing. Staff will continue to monitor safety q15 and provide support.

## 2022-03-23 NOTE — INTERDISCIPLINARY ROUNDS
Behavioral Health Interdisciplinary Rounds     Patient Name: Thomas Kimball  Age: 27 y.o. Room/Bed:  746/  Primary Diagnosis: <principal problem not specified>   Admission Status: Voluntary     Readmission within 30 days: no  Power of  in place: no  Patient requires a blocked bed: yes          Reason for blocked bed: non-binary    VTE Prophylaxis: No    Mobility needs/Fall risk: no  Flu Vaccine : no   Nutritional Plan: no  Consults:          Labs/Testing due today?: no    Sleep hours:        Participation in Care/Groups:  yes  Medication Compliant?: Yes  PRNS (last 24 hours): Antianxiety, Sleep Aid, Pain and cepacol    Restraints (last 24 hours):  no     CIWA (range last 24 hours):     COWS (range last 24 hours):      Alcohol screening (AUDIT) completed -         If applicable, date SBIRT discussed in treatment team AND documented:   AUDIT Screen Score:        Document Brief Intervention (corresponds directly with the 5 A's, Ask, Advise, Assess, Assist, and Arrange): At- Risk Patients (Score 7-15 for women; 8-15 for men)  Discuss concern patient is drinking at unhealthy levels known to increase risk of alcohol-related health problems. Is Patient ready to commit to change? If No:   Encourage reflection   Discuss short term and long term health risks of consuming alcohol   Barriers to change   Reaffirm willingness to help / Educational materials provided  If Yes:   Set goal  REGISTRAT-MAPI provided    Harmful use or Dependence (Score 16 or greater)   Discuss short term and long term health risks of consuming alcohol   Recommendations   Negotiate drinking goal   Recommend addiction specialist/center   Arrange follow-up appointments.     Tobacco - patient is a smoker: Have You Used Tobacco in the Past 30 Days: Yes  Illegal Drugs use: Have You Used Any Illegal Substances Over the Past 12 Months: Yes    24 hour chart check complete: yes ____________________________________________________________________________________________________________    Patient goal(s) for today:   Treatment team focus/goals:   Progress note     LOS:  4  Expected LOS:     Financial concerns/prescription coverage:    Family contact:      Family requesting physician contact today:   Discharge plan:   Access to weapons :        Outpatient provider(s):  Patient's preferred phone number for follow up call :  Patient's preferred e-mail address :  Participating treatment team members: Lai Sanchez, * (assigned SW),

## 2022-03-23 NOTE — BH NOTES
PRN Medication Documentation    Specific patient behavior that led to need for PRN medication: c/o anxiety Staff interventions attempted prior to PRN being given:coping skills PRN medication given:atarax  Patient response/effectiveness of PRN medication: mercedez aware

## 2022-03-23 NOTE — BH NOTES
[id number] Pt discharged per MD order. All belongings returned, upon discharge. Pt verbally contracts for safety. Copy of discharge instructions given to patient. Pt verbalizes understanding.

## 2022-03-23 NOTE — DISCHARGE INSTRUCTIONS
DISCHARGE SUMMARY    NAME:Kate Stewart  : 1995  MRN: [id number]    The patient Dada Simmons exhibits the ability to control behavior in a less restrictive environment. Patient's level of functioning is improving. No assaultive/destructive behavior has been observed for the past 24 hours. No suicidal/homicidal threat or behavior has been observed for the past 24 hours. There is no evidence of serious medication side effects. Patient has not been in physical or protective restraints for at least the past 24 hours. If weapons involved, how are they secured? NA    Is patient aware of and in agreement with discharge plan? yes    Arrangements for medication:  Prescriptions sent to pt pharmacy    Copy of discharge instructions to provider?:  yes    Arrangements for transportation home:  Friends picking up after 1600    Keep all follow up appointments as scheduled, continue to take prescribed medications per physician instructions. Mental health crisis number:  [phone] or your local mental health crisis line number at [facility] at [address] [facility] [address] WARM LINE      [phone])      M-F: 9am to 9pm      Sat & Sun: 5pm - 9pm  National suicide prevention lines:                             3-377-OVYDGUW ([phone])       5-547-240-TALK ([phone])    Crisis Text Line:  Text HOME to BERTA Ruffin 9 from Nurse    PATIENT INSTRUCTIONS:    These are general instructions for a healthy lifestyle:    No smoking/ No tobacco products/ Avoid exposure to second hand smoke  Surgeon General's Warning:  Quitting smoking now greatly reduces serious risk to your health.     Obesity, smoking, and sedentary lifestyle greatly increases your risk for illness    A healthy diet, regular physical exercise & weight monitoring are important for maintaining a healthy lifestyle    You may be retaining fluid if you have a history of heart failure or if you experience any of the following symptoms:  Weight gain of 3 pounds or more overnight or 5 pounds in a week, increased swelling in our hands or feet or shortness of breath while lying flat in bed. Please call your doctor as soon as you notice any of these symptoms; do not wait until your next office visit. The discharge information has been reviewed with the patient. The patient verbalized understanding. Discharge medications reviewed with the patient and appropriate educational materials and side effects teaching were provided.   ___________________________________________________________________________________________________________________________________

## 2025-06-19 NOTE — PROGRESS NOTES
Problem: Falls - Risk of  Goal: *Absence of Falls  Description: Document Shade Provo Fall Risk and appropriate interventions in the flowsheet.   Outcome: Progressing Towards Goal  Note: Fall Risk Interventions: preop anxiety